# Patient Record
Sex: FEMALE | Race: BLACK OR AFRICAN AMERICAN
[De-identification: names, ages, dates, MRNs, and addresses within clinical notes are randomized per-mention and may not be internally consistent; named-entity substitution may affect disease eponyms.]

---

## 2017-10-12 ENCOUNTER — HOSPITAL ENCOUNTER (EMERGENCY)
Dept: HOSPITAL 17 - NEPD | Age: 22
Discharge: HOME | End: 2017-10-12
Payer: MEDICAID

## 2017-10-12 VITALS
TEMPERATURE: 98.6 F | OXYGEN SATURATION: 100 % | SYSTOLIC BLOOD PRESSURE: 135 MMHG | RESPIRATION RATE: 16 BRPM | DIASTOLIC BLOOD PRESSURE: 86 MMHG | HEART RATE: 59 BPM

## 2017-10-12 VITALS — WEIGHT: 180.78 LBS | HEIGHT: 64 IN | BODY MASS INDEX: 30.86 KG/M2

## 2017-10-12 DIAGNOSIS — F41.9: ICD-10-CM
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
## 2017-10-12 DIAGNOSIS — Z79.899: ICD-10-CM

## 2017-10-12 DIAGNOSIS — S29.012A: ICD-10-CM

## 2017-10-12 DIAGNOSIS — F32.9: ICD-10-CM

## 2017-10-12 DIAGNOSIS — S16.1XXA: Primary | ICD-10-CM

## 2017-10-12 PROCEDURE — 99284 EMERGENCY DEPT VISIT MOD MDM: CPT

## 2017-10-12 PROCEDURE — 72072 X-RAY EXAM THORAC SPINE 3VWS: CPT

## 2017-10-12 PROCEDURE — 72040 X-RAY EXAM NECK SPINE 2-3 VW: CPT

## 2017-10-12 PROCEDURE — 84703 CHORIONIC GONADOTROPIN ASSAY: CPT

## 2017-10-12 PROCEDURE — 96372 THER/PROPH/DIAG INJ SC/IM: CPT

## 2017-10-12 NOTE — RADRPT
EXAM DATE/TIME:  10/12/2017 21:03 

 

[facility] COMPARISON:     

No previous studies available for comparison.

 

                     

INDICATIONS :     

Mid-back pain, no known injury.

                     

 

MEDICAL HISTORY :     

None.          

 

SURGICAL HISTORY :     

None.   

 

ENCOUNTER:     

Initial                                        

 

ACUITY:     

2 weeks      

 

PAIN SCORE:     

6/10

 

LOCATION:       

t-spine

 

FINDINGS:     

There is normal alignment of the thoracic vertebral bodies.  Vertebral body height is maintained.  No
 evidence of fracture or subluxation.  Pedicles are intact at all levels.  The paravertebral reflecti
ons are not thickened. 

 

CONCLUSION:     

Unremarkable examination of the thoracic spine.  

 

 

 

 Charles Valdivia MD on October 12, 2017 at 21:19           

Board Certified Radiologist.

 This report was verified electronically.

## 2017-10-12 NOTE — PD
HPI


Chief Complaint:  Back/ Neck Pain or Injury


Time Seen by Provider:  20:18


Travel History


International Travel<30 days:  No


Contact w/Intl Traveler<30days:  No


Traveled to known affect area:  No





History of Present Illness


HPI


patient is an lpn, comes in c/o neck/upper back pain, sharp, spasm like, 7/10, 

worse with movement and lifting any objects.  denies any car accident.





pcp-none


all: none


pshx: c section


pmhx: denies


lmp:  5 days ago





PFSH


Past Medical History


Anxiety:  Yes


Depression:  Yes


Diminished Hearing:  No


Immunizations Current:  Yes


Pregnant?:  Not Pregnant


LMP:  10/7/17


:  0





Social History


Alcohol Use:  No


Tobacco Use:  No


Substance Use:  No





Allergies-Medications


(Allergen,Severity, Reaction):  


Coded Allergies:  


     No Known Allergies (Unverified , 10/12/17)


Reported Meds & Prescriptions





Reported Meds & Active Scripts


Active


Naproxen EC (Naproxen) 375 Mg Tabdr 375 Mg PO BID


Flexeril (Cyclobenzaprine HCl) 10 Mg Tab 10 Mg PO TID


Reported


Fluoxetine (Fluoxetine HCl) 20 Mg Capsule 20 Mg PO DAILY








Physical Exam


Narrative


GENERAL: 


SKIN: Warm and dry.


HEAD: Atraumatic. Normocephalic. 


EYES: Pupils equal and round. No scleral icterus. No injection or drainage. 


ENT: No nasal bleeding or discharge.  Mucous membranes pink and moist.


NECK: Trachea midline. No JVD. 


CARDIOVASCULAR: Regular rate and rhythm.  


RESPIRATORY: No accessory muscle use. Clear to auscultation. Breath sounds 

equal bilaterally. 


GASTROINTESTINAL: Abdomen soft, non-tender, nondistended. 


MUSCULOSKELETAL: Extremities without clubbing, cyanosis, or edema. No obvious 

deformities. 


NEUROLOGICAL: Awake and alert. No obvious cranial nerve deficits.  Motor 

grossly within normal limits. Five out of 5 muscle strength in the arms and 

legs.  Normal speech.


PSYCHIATRIC: Appropriate mood and affect; insight and judgment normal.





Data


Data


Last Documented VS





Vital Signs








  Date Time  Temp Pulse Resp B/P (MAP) Pulse Ox O2 Delivery O2 Flow Rate FiO2


 


10/12/17 18:57 98.6 59 16 135/86 (102) 100 Room Air  








Orders





 Orders


Ed Urine Pregnancytest Poc (10/12/17 20:42)


Spine, Cervical - Ltd (Ap&Lat) (10/12/17 )


Spine, Thoracic-Ap/Lat/Sw(3vw) (10/12/17 )


Orphenadrine Inj (Norflex Inj) (10/12/17 20:45)


Ketorolac Inj (Toradol Inj) (10/12/17 20:45)








MDM


Medical Decision Making


Medical Screen Exam Complete:  Yes


Emergency Medical Condition:  Yes


Medical Record Reviewed:  Yes


Differential Diagnosis


FX V DISLOCATION V NECK/BACK STRAIN


Narrative Course


AFTER EVALUATION OF XRAYS, NEG FX/DISLOCATIONS NOTED...PATIENT IS MUCH BETTER 

AFTER RECEIVING NORFLEX AND NEARLY SYMPTOM FREE.





Diagnosis





 Primary Impression:  


 NECK/BACK STRAIN


Referrals:  


[facility]


Patient Instructions:  Cervical Neck Strain Exercises (GEN), Cervical Strain (ED

), General Instructions, Thoracic Back Strain (ED)





***Additional Instructions:  


YOU CAN FOLLOW UP WITH OUTPATIENT CLINIC FOR FURTHER OUTPATIENT STUDIES, 

PARTICULARLY MRI IF SYMPTOMS CONTINUE. TODAY NO EVIDENCE OF FRACTURE OR 

DISLOCATION OF ANY NECK/BACK JOINTS.


Scripts


Naproxen DR (Naproxen EC) 375 Mg Tabdr


375 MG PO BID, #20 TAB 0 Refills


   Prov: Guillermo Sheridan MD         10/12/17 


Cyclobenzaprine (Flexeril) 10 Mg Tab


10 MG PO TID for Muscle Spasm, #21 TAB 0 Refills


   Prov: Guillermo Sheridan MD         10/12/17


Disposition:  01 DISCHARGE HOME


Condition:  Stable











Guillermo Sheridan MD Oct 12, 2017 20:28

## 2017-10-12 NOTE — RADRPT
EXAM DATE/TIME:  10/12/2017 20:57 

 

[facility] COMPARISON:     

No previous studies available for comparison.

 

                     

INDICATIONS :     

Neck pain, no known injury.

                     

 

MEDICAL HISTORY :     

None.          

 

SURGICAL HISTORY :     

None.   

 

ENCOUNTER:     

Initial                                        

 

ACUITY:     

1 week      

 

PAIN SCORE:     

6/10

 

LOCATION:       

c-spine

 

FINDINGS:     

Two projection examination was performed.  There is normal alignment and curvature of the vertebral b
odies down to the level of C7.  No evidence of fracture or subluxation.  Vertebral body height is celena
ntained.  The disc spaces are maintained.  The prevertebral soft tissues are of normal thickness.  Th
e atlanto-axial articulation is intact.

 

CONCLUSION:     

Unremarkable limited examination of the cervical spine.  

 

 

 

 Charles Valdivia MD on October 12, 2017 at 21:18           

Board Certified Radiologist.

 This report was verified electronically.

## 2018-03-18 ENCOUNTER — HOSPITAL ENCOUNTER (EMERGENCY)
Dept: HOSPITAL 17 - NEPD | Age: 23
Discharge: HOME | End: 2018-03-18
Payer: COMMERCIAL

## 2018-03-18 VITALS — HEIGHT: 64 IN | WEIGHT: 178.57 LBS | BODY MASS INDEX: 30.49 KG/M2

## 2018-03-18 VITALS
TEMPERATURE: 98.6 F | OXYGEN SATURATION: 100 % | DIASTOLIC BLOOD PRESSURE: 86 MMHG | HEART RATE: 104 BPM | RESPIRATION RATE: 17 BRPM | SYSTOLIC BLOOD PRESSURE: 155 MMHG

## 2018-03-18 DIAGNOSIS — K52.9: Primary | ICD-10-CM

## 2018-03-18 LAB
ALBUMIN SERPL-MCNC: 4.1 GM/DL (ref 3.4–5)
ALP SERPL-CCNC: 47 U/L (ref 45–117)
ALT SERPL-CCNC: 16 U/L (ref 10–53)
AST SERPL-CCNC: 18 U/L (ref 15–37)
BACTERIA #/AREA URNS HPF: (no result) /HPF
BASOPHILS # BLD AUTO: 0 TH/MM3 (ref 0–0.2)
BASOPHILS NFR BLD: 0.3 % (ref 0–2)
BILIRUB SERPL-MCNC: 0.8 MG/DL (ref 0.2–1)
BUN SERPL-MCNC: 16 MG/DL (ref 7–18)
CALCIUM SERPL-MCNC: 9 MG/DL (ref 8.5–10.1)
CHLORIDE SERPL-SCNC: 107 MEQ/L (ref 98–107)
COLOR UR: YELLOW
CREAT SERPL-MCNC: 0.89 MG/DL (ref 0.5–1)
EOSINOPHIL # BLD: 0.4 TH/MM3 (ref 0–0.4)
EOSINOPHIL NFR BLD: 4.1 % (ref 0–4)
ERYTHROCYTE [DISTWIDTH] IN BLOOD BY AUTOMATED COUNT: 23.6 % (ref 11.6–17.2)
GFR SERPLBLD BASED ON 1.73 SQ M-ARVRAT: 94 ML/MIN (ref 89–?)
GLUCOSE SERPL-MCNC: 90 MG/DL (ref 74–106)
GLUCOSE UR STRIP-MCNC: (no result) MG/DL
HCO3 BLD-SCNC: 19.4 MEQ/L (ref 21–32)
HCT VFR BLD CALC: 32.4 % (ref 35–46)
HGB BLD-MCNC: 10.5 GM/DL (ref 11.6–15.3)
HGB UR QL STRIP: (no result)
KETONES UR STRIP-MCNC: (no result) MG/DL
LYMPHOCYTES # BLD AUTO: 0.9 TH/MM3 (ref 1–4.8)
LYMPHOCYTES NFR BLD AUTO: 10.1 % (ref 9–44)
MCH RBC QN AUTO: 19.4 PG (ref 27–34)
MCHC RBC AUTO-ENTMCNC: 32.3 % (ref 32–36)
MCV RBC AUTO: 60.1 FL (ref 80–100)
MONOCYTE #: 0.5 TH/MM3 (ref 0–0.9)
MONOCYTES NFR BLD: 6.2 % (ref 0–8)
MUCOUS THREADS #/AREA URNS LPF: (no result) /LPF
NEUTROPHILS # BLD AUTO: 6.9 TH/MM3 (ref 1.8–7.7)
NEUTROPHILS NFR BLD AUTO: 79.3 % (ref 16–70)
NITRITE UR QL STRIP: (no result)
PLATELET # BLD: 370 TH/MM3 (ref 150–450)
PMV BLD AUTO: 8.9 FL (ref 7–11)
PROT SERPL-MCNC: 9.1 GM/DL (ref 6.4–8.2)
RBC # BLD AUTO: 5.4 MIL/MM3 (ref 4–5.3)
SODIUM SERPL-SCNC: 136 MEQ/L (ref 136–145)
SP GR UR STRIP: 1.04 (ref 1–1.03)
SQUAMOUS #/AREA URNS HPF: 6 /HPF (ref 0–5)
URINE LEUKOCYTE ESTERASE: (no result)
WBC # BLD AUTO: 8.7 TH/MM3 (ref 4–11)

## 2018-03-18 PROCEDURE — C9113 INJ PANTOPRAZOLE SODIUM, VIA: HCPCS

## 2018-03-18 PROCEDURE — 84703 CHORIONIC GONADOTROPIN ASSAY: CPT

## 2018-03-18 PROCEDURE — 80053 COMPREHEN METABOLIC PANEL: CPT

## 2018-03-18 PROCEDURE — 96375 TX/PRO/DX INJ NEW DRUG ADDON: CPT

## 2018-03-18 PROCEDURE — 96374 THER/PROPH/DIAG INJ IV PUSH: CPT

## 2018-03-18 PROCEDURE — 81001 URINALYSIS AUTO W/SCOPE: CPT

## 2018-03-18 PROCEDURE — 83690 ASSAY OF LIPASE: CPT

## 2018-03-18 PROCEDURE — 85025 COMPLETE CBC W/AUTO DIFF WBC: CPT

## 2018-03-18 PROCEDURE — 99284 EMERGENCY DEPT VISIT MOD MDM: CPT

## 2018-03-18 NOTE — PD
HPI


Chief Complaint:  GI Complaint


Time Seen by Provider:  15:34


Travel History


International Travel<30 days:  No


Contact w/Intl Traveler<30days:  No


Traveled to known affect area:  No





History of Present Illness


HPI


23-year-old female presents to the emergency department for evaluation of 

diarrhea, nausea, epigastric pain.  Patient states her current pain is 0/10.  

She states she is currently pain-free.  Patient reports history of depression 

and is on Lexapro.  She has no other medical problems.  She denies pregnancy.  

She denies any abnormal vaginal discharge or risk of STDs.  Patient states she 

has had over 10 episodes of diarrhea today.  She states she took Imodium at 11 

AM without improvement.  She has not been on antibiotics recently.  She reports 

history of .  She denies urinary symptoms.  No exacerbating or 

alleviating factors.  Moderate severity.





PFSH


Past Medical History


Anxiety:  Yes


Depression:  Yes


Diminished Hearing:  No


Immunizations Current:  Yes


Pregnant?:  Not Pregnant


:  0





Social History


Alcohol Use:  No


Tobacco Use:  No


Substance Use:  No





Allergies-Medications


(Allergen,Severity, Reaction):  


Coded Allergies:  


     No Known Allergies (Unverified , 10/12/17)


Reported Meds & Prescriptions





Reported Meds & Active Scripts


Active


Naproxen EC (Naproxen) 375 Mg Tabdr 375 Mg PO BID


Flexeril (Cyclobenzaprine HCl) 10 Mg Tab 10 Mg PO TID


Reported


Fluoxetine (Fluoxetine HCl) 20 Mg Capsule 20 Mg PO DAILY








Review of Systems


Except as stated in HPI:  all other systems reviewed are Neg





Physical Exam


Narrative


GENERAL: Well-nourished, well-developed female patient, ambulatory.  Afebrile.


SKIN: Focused skin assessment warm/dry.


HEAD: Normocephalic.  Atraumatic.


EYES: No scleral icterus. No injection or drainage. 


NECK: Supple, trachea midline. No JVD or lymphadenopathy.


CARDIOVASCULAR: Regular rate and rhythm without murmurs, gallops, or rubs. 


RESPIRATORY: Breath sounds equal bilaterally. No accessory muscle use.  Lung 

sounds are clear to auscultation.


GASTROINTESTINAL: Abdomen soft, non-tender, nondistended.  No abdominal 

tenderness to palpation peer


MUSCULOSKELETAL: No cyanosis, or edema. 


BACK: Nontender without obvious deformity. No CVA tenderness.





Data


Data


Last Documented VS





Vital Signs








  Date Time  Temp Pulse Resp B/P (MAP) Pulse Ox O2 Delivery O2 Flow Rate FiO2


 


3/18/18 14:25 98.6 104 17 155/86 (109) 100   








Orders





 Orders


Complete Blood Count With Diff (3/18/18 15:06)


Comprehensive Metabolic Panel (3/18/18 15:06)


Urinalysis - C+S If Indicated (3/18/18 15:06)


Ed Urine Pregnancytest Poc (3/18/18 15:06)


Lipase (3/18/18 15:06)


Sodium Chlor 0.9% 1000 Ml Inj (Ns 1000 M (3/18/18 16:00)


Ondansetron Inj (Zofran Inj) (3/18/18 16:00)


Pantoprazole Inj (Protonix Inj) (3/18/18 16:00)


Loperamide (Imodium) (3/18/18 16:00)





Labs





Laboratory Tests








Test


  3/18/18


15:55


 


White Blood Count 8.7 TH/MM3 


 


Red Blood Count 5.40 MIL/MM3 


 


Hemoglobin 10.5 GM/DL 


 


Hematocrit 32.4 % 


 


Mean Corpuscular Volume 60.1 FL 


 


Mean Corpuscular Hemoglobin 19.4 PG 


 


Mean Corpuscular Hemoglobin


Concent 32.3 % 


 


 


Red Cell Distribution Width 23.6 % 


 


Platelet Count 370 TH/MM3 


 


Mean Platelet Volume 8.9 FL 


 


Neutrophils (%) (Auto) 79.3 % 


 


Lymphocytes (%) (Auto) 10.1 % 


 


Monocytes (%) (Auto) 6.2 % 


 


Eosinophils (%) (Auto) 4.1 % 


 


Basophils (%) (Auto) 0.3 % 


 


Neutrophils # (Auto) 6.9 TH/MM3 


 


Lymphocytes # (Auto) 0.9 TH/MM3 


 


Monocytes # (Auto) 0.5 TH/MM3 


 


Eosinophils # (Auto) 0.4 TH/MM3 


 


Basophils # (Auto) 0.0 TH/MM3 


 


CBC Comment DIFF FINAL 


 


Differential Comment  


 


Urine Color YELLOW 


 


Urine Turbidity HAZY 


 


Urine pH 5.5 


 


Urine Specific Gravity 1.042 


 


Urine Protein 30 mg/dL 


 


Urine Glucose (UA) NEG mg/dL 


 


Urine Ketones NEG mg/dL 


 


Urine Occult Blood NEG 


 


Urine Nitrite NEG 


 


Urine Bilirubin NEG 


 


Urine Urobilinogen 2.0 MG/DL 


 


Urine Leukocyte Esterase NEG 


 


Urine RBC 1 /hpf 


 


Urine WBC 2 /hpf 


 


Urine Squamous Epithelial


Cells 6 /hpf 


 


 


Urine Bacteria RARE /hpf 


 


Urine Mucus MOD /lpf 


 


Microscopic Urinalysis Comment


  CULT NOT


INDICATED


 


Blood Urea Nitrogen 16 MG/DL 


 


Creatinine 0.89 MG/DL 


 


Random Glucose 90 MG/DL 


 


Total Protein 9.1 GM/DL 


 


Albumin 4.1 GM/DL 


 


Calcium Level 9.0 MG/DL 


 


Alkaline Phosphatase 47 U/L 


 


Aspartate Amino Transf


(AST/SGOT) 18 U/L 


 


 


Alanine Aminotransferase


(ALT/SGPT) 16 U/L 


 


 


Total Bilirubin 0.8 MG/DL 


 


Sodium Level 136 MEQ/L 


 


Potassium Level 3.7 MEQ/L 


 


Chloride Level 107 MEQ/L 


 


Carbon Dioxide Level 19.4 MEQ/L 


 


Anion Gap 10 MEQ/L 


 


Estimat Glomerular Filtration


Rate 94 ML/MIN 


 


 


Lipase 76 U/L 











[facility]


Medical Decision Making


Medical Screen Exam Complete:  Yes


Emergency Medical Condition:  Yes


Medical Record Reviewed:  Yes


Differential Diagnosis


Gastroenteritis versus pancreatitis versus gastritis


Narrative Course


23-year-old female presents to the emergency department for evaluation of 

diarrhea and epigastric abdominal pain.  She currently has no pain.  Abdominal 

exam is benign.  IV access is obtained.  CBC, CMP, lipase, UA, urine pregnancy 

test ordered and pending.  Patient is given normal saline 1 L IV bolus, Zofran 

4 mg IV, Protonix 40 mg IV, Imodium 4 mg p.o.





CBC shows slight anemia hemoglobin 10.5, hematocrit 32.4.  CMP shows no acute 

abnormality.  Lipase is 76.  UA is negative for acute infarct.  UPT is negative.





Upon reassessment, the patient states she is feeling much better.  She has had 

no further episodes of diarrhea.  Patient is stable for discharge.  She will be 

discharged prescription for Imodium and Zofran.  She is to follow-up with her 

primary care physician or return here for any acute worsening of symptoms.





The patient was discharged in stable condition with instructions, including 

return instructions and follow up  


instructions.





Diagnosis





 Primary Impression:  


 Gastroenteritis


Referrals:  


Primary Care Physician


call for appointment


Patient Instructions:  Gastroenteritis (ED), General Instructions


Departure Forms:  Tests/Procedures, Work Release   Enter return to work date:  

Mar 20, 2018





***Additional Instructions:  


Take Imodium as directed as needed for diarrhea.


Take Zofran as directed as needed for nausea/vomiting.


[place] diet.


Drink plenty of fluids.


Follow-up with a primary care physician.


Return to the emergency department for any acute worsening of symptoms.


***Med/Other Pt SpecificInfo:  Prescription(s) given


Scripts


Loperamide (Imodium A-D) 2 Mg Capsule


2 MG PO AS DIRECTED Y for DIARRHEA for 3 Days, CAP 0 Refills


   One capsule after each loose stool.


   Not to exceed 8 tablets per day.


   Prov: Denisse Truong         3/18/18 


Ondansetron Odt (Ondansetron Odt) 4 Mg Tab


4 MG SL Q6HR Y for Nausea/Vomiting, #12 TAB 0 Refills


   Prov: Denisse Truong         3/18/18


Disposition:  01 DISCHARGE HOME


Condition:  Stable











Denisse Truong Mar 18, 2018 15:59